# Patient Record
Sex: MALE | Race: WHITE | Employment: FULL TIME | URBAN - METROPOLITAN AREA
[De-identification: names, ages, dates, MRNs, and addresses within clinical notes are randomized per-mention and may not be internally consistent; named-entity substitution may affect disease eponyms.]

---

## 2019-06-07 ENCOUNTER — HOSPITAL ENCOUNTER (OUTPATIENT)
Age: 54
Discharge: HOME | End: 2019-06-07
Payer: COMMERCIAL

## 2019-06-07 ENCOUNTER — APPOINTMENT (OUTPATIENT)
Dept: GENERAL RADIOLOGY | Age: 54
End: 2019-06-07
Attending: PHYSICIAN ASSISTANT
Payer: COMMERCIAL

## 2019-06-07 VITALS
SYSTOLIC BLOOD PRESSURE: 139 MMHG | DIASTOLIC BLOOD PRESSURE: 78 MMHG | RESPIRATION RATE: 17 BRPM | TEMPERATURE: 99 F | HEART RATE: 85 BPM | OXYGEN SATURATION: 97 %

## 2019-06-07 DIAGNOSIS — L02.419: ICD-10-CM

## 2019-06-07 DIAGNOSIS — L03.115 CELLULITIS OF KNEE, RIGHT: Primary | ICD-10-CM

## 2019-06-07 PROCEDURE — 87147 CULTURE TYPE IMMUNOLOGIC: CPT | Performed by: PHYSICIAN ASSISTANT

## 2019-06-07 PROCEDURE — 87205 SMEAR GRAM STAIN: CPT | Performed by: PHYSICIAN ASSISTANT

## 2019-06-07 PROCEDURE — 73562 X-RAY EXAM OF KNEE 3: CPT | Performed by: PHYSICIAN ASSISTANT

## 2019-06-07 PROCEDURE — 10060 I&D ABSCESS SIMPLE/SINGLE: CPT

## 2019-06-07 PROCEDURE — 87070 CULTURE OTHR SPECIMN AEROBIC: CPT | Performed by: PHYSICIAN ASSISTANT

## 2019-06-07 PROCEDURE — 85025 COMPLETE CBC W/AUTO DIFF WBC: CPT | Performed by: PHYSICIAN ASSISTANT

## 2019-06-07 PROCEDURE — 99204 OFFICE O/P NEW MOD 45 MIN: CPT

## 2019-06-07 PROCEDURE — 96365 THER/PROPH/DIAG IV INF INIT: CPT

## 2019-06-07 PROCEDURE — 87186 SC STD MICRODIL/AGAR DIL: CPT | Performed by: PHYSICIAN ASSISTANT

## 2019-06-07 PROCEDURE — 80047 BASIC METABLC PNL IONIZED CA: CPT

## 2019-06-07 RX ORDER — CEPHALEXIN 500 MG/1
500 CAPSULE ORAL 4 TIMES DAILY
Qty: 28 CAPSULE | Refills: 0 | Status: SHIPPED | OUTPATIENT
Start: 2019-06-07 | End: 2019-06-14

## 2019-06-07 NOTE — ED PROVIDER NOTES
Patient Seen in: [facility]    History   Patient presents with:  Cellulitis (integumentary, infectious)    Stated Complaint: r knee swelling, redness and blister    HPI    Akil Pino is a 53-year-old [facility]: He exhibits swelling, effusion and erythema. He exhibits normal range of motion, no ecchymosis, no deformity, no laceration, normal alignment, no LCL laxity, normal patellar mobility, no bony tenderness, normal meniscus and no MCL laxity. CONCLUSION:  No evidence of acute displaced fracture or dislocation in the right knee. No radiographic evidence of osteomyelitis. Prepatellar soft tissue swelling.   Dictated by: Fermin Ho MD on 6/07/2019 at 16:03     Approved by: Fermin Ho MD There is no disposition on file for this visit. There is no disposition time on file for this visit.     Follow-up:  Regino Cheung MD  [address] [facility] [address]  [place] [address] [id number] [address] [facility]  [address] [facility]

## 2019-06-07 NOTE — ED INITIAL ASSESSMENT (HPI)
C/O cellulitis that started on Sunday and has gotten worse. Denies any known issue, started as a red lucien.

## 2019-06-08 ENCOUNTER — HOSPITAL ENCOUNTER (OUTPATIENT)
Age: 54
Discharge: HOME | End: 2019-06-08
Payer: COMMERCIAL

## 2019-06-08 VITALS
RESPIRATION RATE: 18 BRPM | DIASTOLIC BLOOD PRESSURE: 87 MMHG | TEMPERATURE: 98 F | SYSTOLIC BLOOD PRESSURE: 142 MMHG | HEART RATE: 69 BPM | OXYGEN SATURATION: 99 %

## 2019-06-08 DIAGNOSIS — Z51.89 VISIT FOR WOUND CHECK: Primary | ICD-10-CM

## 2019-06-08 PROCEDURE — 99211 OFF/OP EST MAY X REQ PHY/QHP: CPT

## 2019-06-08 NOTE — ED PROVIDER NOTES
Patient Seen in: [facility]    History   Patient presents with:  Wound Recheck    Stated Complaint: Mika Pander UP RIGHT KNEE    HPI    Patient here for wound check of his right knee he does feel like it is improving he has been berta Skin: Skin is warm and dry. No rash noted. He is not diaphoretic. No erythema. No pallor. Psychiatric: He has a normal mood and affect.  His behavior is normal. Judgment and thought content normal.           ED Course   Labs Reviewed - No data to display - as instructed. The patient verbalized understanding of the discharge instructions and plan.

## 2019-06-09 ENCOUNTER — HOSPITAL ENCOUNTER (OUTPATIENT)
Age: 54
Discharge: HOME | End: 2019-06-09
Attending: FAMILY MEDICINE
Payer: COMMERCIAL

## 2019-06-09 VITALS
OXYGEN SATURATION: 99 % | HEART RATE: 78 BPM | TEMPERATURE: 98 F | DIASTOLIC BLOOD PRESSURE: 85 MMHG | SYSTOLIC BLOOD PRESSURE: 130 MMHG | HEIGHT: 68 IN | RESPIRATION RATE: 16 BRPM | WEIGHT: 165 LBS | BODY MASS INDEX: 25.01 KG/M2

## 2019-06-09 DIAGNOSIS — L71.9 ROSACEA: ICD-10-CM

## 2019-06-09 DIAGNOSIS — Z76.0 ENCOUNTER FOR MEDICATION REFILL: ICD-10-CM

## 2019-06-09 DIAGNOSIS — L03.115 CELLULITIS OF RIGHT KNEE: ICD-10-CM

## 2019-06-09 DIAGNOSIS — M54.31 RIGHT SIDED SCIATICA: Primary | ICD-10-CM

## 2019-06-09 PROCEDURE — 99214 OFFICE O/P EST MOD 30 MIN: CPT

## 2019-06-09 PROCEDURE — 99213 OFFICE O/P EST LOW 20 MIN: CPT

## 2019-06-09 RX ORDER — DICLOFENAC SODIUM 75 MG/1
75 TABLET, DELAYED RELEASE ORAL 2 TIMES DAILY
Qty: 20 TABLET | Refills: 0 | Status: SHIPPED | OUTPATIENT
Start: 2019-06-09

## 2019-06-09 RX ORDER — METRONIDAZOLE 7.5 MG/G
GEL TOPICAL
Qty: 45 G | Refills: 0 | Status: SHIPPED | OUTPATIENT
Start: 2019-06-09

## 2019-06-09 NOTE — ED INITIAL ASSESSMENT (HPI)
About 2 weeks ago pt c/o pain to back of R thigh, completely resolved; pain slowing coming back 3-4 days ago, worsening today; pt cannot recall specific injury or heavy lifting; no pain when sitting or laying down    Started cephalexin on 6/7/19; redness t

## 2019-06-09 NOTE — ED PROVIDER NOTES
Patient Seen in: [facility]    History   Patient presents with:  Pain (neurologic)    Stated Complaint: RIGHT THIGH BACK PAIN    HPI    This 53-year-old male presents to the office with multiple concerns today.   The first is rig PAIN  Other systems are as noted in HPI. Constitutional and vital signs reviewed. All other systems reviewed and negative except as noted above.     Physical Exam     ED Triage Vitals [06/09/19 0814]   /85   Pulse 78   Resp 16   Temp 97.9 °F (36 effects are reviewed. I refilled the patient's metronidazole gel 0.75% to be used twice daily for his rosacea. Skin care and sun precautions are discussed. The cellulitis to his knee appears to continue to improve.   He should continue with the Keflex as day.  Avoid heavy lifting. Once your pain has improved, start stretching exercises. Go to the closest Emergency Department if you develop uncontrolled pain, spontaneous loss of urine or stool, numbness or weakness into your arms or legs.   Follow-up with yo

## 2019-06-10 ENCOUNTER — TELEPHONE (OUTPATIENT)
Dept: URGENT CARE | Age: 54
End: 2019-06-10

## 2019-06-11 ENCOUNTER — HOSPITAL ENCOUNTER (OUTPATIENT)
Age: 54
Discharge: HOME | End: 2019-06-11
Attending: FAMILY MEDICINE
Payer: COMMERCIAL

## 2019-06-11 VITALS
OXYGEN SATURATION: 98 % | SYSTOLIC BLOOD PRESSURE: 151 MMHG | HEART RATE: 71 BPM | TEMPERATURE: 99 F | WEIGHT: 165 LBS | BODY MASS INDEX: 25.01 KG/M2 | DIASTOLIC BLOOD PRESSURE: 98 MMHG | RESPIRATION RATE: 20 BRPM | HEIGHT: 68 IN

## 2019-06-11 DIAGNOSIS — L03.115 CELLULITIS OF KNEE, RIGHT: Primary | ICD-10-CM

## 2019-06-11 PROCEDURE — 99211 OFF/OP EST MAY X REQ PHY/QHP: CPT

## 2019-06-11 RX ORDER — CEPHALEXIN 500 MG/1
500 CAPSULE ORAL 3 TIMES DAILY
Qty: 21 CAPSULE | Refills: 0 | Status: SHIPPED | OUTPATIENT
Start: 2019-06-11 | End: 2019-06-18

## 2019-06-12 NOTE — ED PROVIDER NOTES
Patient Seen in: [address] [facility] [place]    History   Patient presents with:  Rash Skin Problem (integumentary)    Stated Complaint: fight knee wound check    HPI    43-year-old male presents today for follow-up on his left anterior knee abs improving with improved cellulitis, less pain. Continue Keflex as prescribed. Another 7 days of Keflex added. Wound dressing changes every day.   Monitor, look for any signs of worsening infection otherwise follow-up with primary care physician in a week

## 2022-12-22 NOTE — ED NOTES
Called to patient, message left to voicemail to call [facility] back.   (this is to notify of final Aerobic Bacterial culture result.)    Verbally discussed with Dr. Homar Nolan result and sensitivity chart reviewed-appropriate treatment and no further action needed. INFO PROVIDED

## (undated) NOTE — LETTER
[facility] IN [place]  [address] [address]  Dept: [phone]  Dept Fax: [phone]         June 9, 2019    Patient: Echo Saldivar   YOB: 1965   Date of Visit: 6/9/2019       To Whom It May Concern:

## (undated) NOTE — LETTER
Date & Time: 6/7/2019, 4:40 PM  Patient: Amna Hernandezaddior  Encounter Provider(s):    Latasha Thomas       To Whom It May Concern:    Amna Guillermoor was seen and treated in our department on 6/7/2019. He should not return to work until 6/10/19.     If

## (undated) NOTE — LETTER
Date & Time: 6/8/2019, 12:07 PM  Patient: Megan Galeano  Encounter Provider(s):    Latahsa Winter       To Whom It May Concern:    Megan Galeano was seen and treated in our department on 6/8/2019. He should not return to work until 6/11/19.     I